# Patient Record
Sex: FEMALE | Race: WHITE
[De-identification: names, ages, dates, MRNs, and addresses within clinical notes are randomized per-mention and may not be internally consistent; named-entity substitution may affect disease eponyms.]

---

## 2017-04-18 ENCOUNTER — HOSPITAL ENCOUNTER (EMERGENCY)
Dept: HOSPITAL 62 - ER | Age: 62
Discharge: HOME | End: 2017-04-18
Payer: OTHER GOVERNMENT

## 2017-04-18 VITALS — DIASTOLIC BLOOD PRESSURE: 53 MMHG | SYSTOLIC BLOOD PRESSURE: 120 MMHG

## 2017-04-18 DIAGNOSIS — R10.30: Primary | ICD-10-CM

## 2017-04-18 DIAGNOSIS — N28.89: ICD-10-CM

## 2017-04-18 DIAGNOSIS — R03.0: ICD-10-CM

## 2017-04-18 DIAGNOSIS — Z90.49: ICD-10-CM

## 2017-04-18 DIAGNOSIS — R11.0: ICD-10-CM

## 2017-04-18 DIAGNOSIS — J45.909: ICD-10-CM

## 2017-04-18 DIAGNOSIS — Z90.710: ICD-10-CM

## 2017-04-18 LAB
%HYPO/RBC NFR BLD AUTO: (no result) %
ALBUMIN SERPL-MCNC: 4.4 G/DL (ref 3.5–5)
ALP SERPL-CCNC: 69 U/L (ref 38–126)
ALT SERPL-CCNC: 23 U/L (ref 9–52)
ANION GAP SERPL CALC-SCNC: 13 MMOL/L (ref 5–19)
ANISOCYTOSIS BLD QL SMEAR: SLIGHT
APPEARANCE UR: CLEAR
AST SERPL-CCNC: 20 U/L (ref 14–36)
BASO STIPL BLD QL SMEAR: PRESENT
BASOPHILS # BLD AUTO: 0.1 10^3/UL (ref 0–0.2)
BASOPHILS NFR BLD AUTO: 1.1 % (ref 0–2)
BILIRUB DIRECT SERPL-MCNC: 0.3 MG/DL (ref 0–0.4)
BILIRUB SERPL-MCNC: 0.9 MG/DL (ref 0.2–1.3)
BILIRUB UR QL STRIP: NEGATIVE
BUN SERPL-MCNC: 15 MG/DL (ref 7–20)
CALCIUM: 10.2 MG/DL (ref 8.4–10.2)
CHLORIDE SERPL-SCNC: 108 MMOL/L (ref 98–107)
CO2 SERPL-SCNC: 23 MMOL/L (ref 22–30)
CREAT SERPL-MCNC: 0.73 MG/DL (ref 0.52–1.25)
DACRYOCYTES BLD QL SMEAR: (no result)
EOSINOPHIL # BLD AUTO: 0 10^3/UL (ref 0–0.6)
EOSINOPHIL NFR BLD AUTO: 0.3 % (ref 0–6)
ERYTHROCYTE [DISTWIDTH] IN BLOOD BY AUTOMATED COUNT: 15.8 % (ref 11.5–14)
GLUCOSE SERPL-MCNC: 128 MG/DL (ref 75–110)
GLUCOSE UR STRIP-MCNC: NEGATIVE MG/DL
HCT VFR BLD CALC: 38.8 % (ref 36–47)
HGB BLD-MCNC: 12.1 G/DL (ref 12–15.5)
HGB HCT DIFFERENCE: -2.5
KETONES UR STRIP-MCNC: NEGATIVE MG/DL
LIPASE SERPL-CCNC: 107 U/L (ref 23–300)
LYMPHOCYTES # BLD AUTO: 1.1 10^3/UL (ref 0.5–4.7)
LYMPHOCYTES NFR BLD AUTO: 12.1 % (ref 13–45)
MCH RBC QN AUTO: 19.6 PG (ref 27–33.4)
MCHC RBC AUTO-ENTMCNC: 31.1 G/DL (ref 32–36)
MCV RBC AUTO: 63 FL (ref 80–97)
MICROCYTES BLD QL SMEAR: (no result)
MONOCYTES # BLD AUTO: 0.3 10^3/UL (ref 0.1–1.4)
MONOCYTES NFR BLD AUTO: 3.2 % (ref 3–13)
NEUTROPHILS # BLD AUTO: 7.8 10^3/UL (ref 1.7–8.2)
NEUTS SEG NFR BLD AUTO: 83.3 % (ref 42–78)
NITRITE UR QL STRIP: NEGATIVE
OVALOCYTES BLD QL SMEAR: (no result)
PATH REV BLD -IMP: (no result)
PH UR STRIP: 7 [PH] (ref 5–9)
POIKILOCYTOSIS BLD QL SMEAR: (no result)
POLYCHROMASIA BLD QL SMEAR: SLIGHT
POTASSIUM SERPL-SCNC: 4.6 MMOL/L (ref 3.6–5)
PROT SERPL-MCNC: 7.2 G/DL (ref 6.3–8.2)
PROT UR STRIP-MCNC: NEGATIVE MG/DL
RBC # BLD AUTO: 6.14 10^6/UL (ref 3.72–5.28)
SODIUM SERPL-SCNC: 144.3 MMOL/L (ref 137–145)
SP GR UR STRIP: 1
TARGETS BLD QL SMEAR: SLIGHT
UROBILINOGEN UR-MCNC: NEGATIVE MG/DL (ref ?–2)
WBC # BLD AUTO: 9.4 10^3/UL (ref 4–10.5)

## 2017-04-18 PROCEDURE — 80053 COMPREHEN METABOLIC PANEL: CPT

## 2017-04-18 PROCEDURE — 83690 ASSAY OF LIPASE: CPT

## 2017-04-18 PROCEDURE — 99284 EMERGENCY DEPT VISIT MOD MDM: CPT

## 2017-04-18 PROCEDURE — 85025 COMPLETE CBC W/AUTO DIFF WBC: CPT

## 2017-04-18 PROCEDURE — 74177 CT ABD & PELVIS W/CONTRAST: CPT

## 2017-04-18 PROCEDURE — 81001 URINALYSIS AUTO W/SCOPE: CPT

## 2017-04-18 PROCEDURE — 36415 COLL VENOUS BLD VENIPUNCTURE: CPT

## 2017-04-18 NOTE — ER DOCUMENT REPORT
ED GI/





- General


Chief Complaint: Abdominal Pain


Stated Complaint: ABDOMINAL PAIN


Mode of Arrival: Ambulatory


Information source: Patient


TRAVEL OUTSIDE OF THE [place] IN LAST 30 DAYS: No





- HPI


Patient complains to provider of: Abdominal pain


Notes: 





17 05:41


Patient arrives with complaints of abdominal pain with nausea and feelings of 

vomiting started earlier today.  She denies any chest pain or shortness of 

breath.  She denies any fevers.  She denies any dysuria or hematuria.  Nothing 

seems to make her pain better or worse.  She had no fever.  She denies any 

numbness tingling weakness or rash.  She denies any other complaints at this 

moment.  She has had prior hysterectomy and appendectomy.  She denies any other 

abdominal surgeries, she denies any history of bowel structure.





- Related Data


Allergies/Adverse Reactions: 


 





No Known Allergies Allergy (Unverified 17 00:43)


 











Past Medical History





- Social History


Smoking Status: Never Smoker


Frequency of alcohol use: Occasional


Drug Abuse: None


Family History: Reviewed & Not Pertinent


Patient has suicidal ideation: No


Patient has homicidal ideation: No


Pulmonary Medical History: Reports: Hx Asthma


Renal/ Medical History: Denies: Hx Peritoneal Dialysis


Past Surgical History: Reports: Hx Appendectomy, Hx  Section - x2, Hx 

Hysterectomy, Hx Tonsillectomy





Review of Systems





- Review of Systems


-: Yes All other systems reviewed and negative





Physical Exam





- Vital signs


Vitals: 





 











Temp Pulse Resp BP Pulse Ox


 


 97.7 F   80   18   165/74 H  97 


 


 17 00:35  17 00:35  17 00:35  17 00:35  17 00:35














- Notes


Notes: 


GENERAL: alert, cooperative, nontoxic, no distress.


HEAD: normocephalic, atraumatic


EYES: conjunctiva pink without discharge, no external redness or swelling.


EARS: no external swelling, no external redness


NOSE: atraumatic, no external swelling


MOUTH/THROAT: mucous membranes moist and pink, posterior pharynx without 

erythema, swelling, exudate. No trismus or drooling.


NECK: soft, supple, full range of motion, no meningismus.


CHEST: no distress, lungs clear and equal throughout.  No wheezing, rales, 

rhonchi.


CARDIAC: regular rate and rhythm, no murmur, normal capillary refill, normal 

pulses.  No peripheral edema noted.


ABDOMEN: Soft, mild generalized tenderness to the mid and lower abdomen.  No 

masses.  No hepatosplenomegaly.


BACK: full range of motion, no CVA tenderness.


EXTREMITIES: full range of motion of all extremities.  No redness, no swelling.


NEURO: alert and oriented -3, no focal deficits, full range of motion of all 

extremities.


PYSCH: appropriate mood, affect.  Patient is cooperative.


SKIN: pink, warm, dry, no rash.





Course





- Re-evaluation


Re-evalutation: 


17 05:42


Patient's nontoxic appearing with stable vitals.  The patient arrives with some 

intermittent abdominal pain with nausea no vomiting no diarrhea no fever.  Some 

mild lower abdominal tenderness on exam.  She had no chest pain or shortness of 

breath with this.  No urinary symptoms.  Blood work is unremarkable with a 

normal white count, normal LFTs and lipase normal unremarkable urine.  CT 

abdomen and pelvis with IV contrast shows no acute findings but a 1.5 cm left 

renal mass.  Recommending further evaluation of this with more dynamic testing.

  I discussed this with the patient and the importance for her to follow-up 

with her primary care physician in order to have further workup of this renal 

mass.  She verbalized an understanding of this.  I will discharge the patient 

home with some Bentyl and Zofran.  Follow up with her primary care doctor at 

the next available appointment.  She was instructed to return the emergency 

Department she develops increasing pain, high fever, persistent vomiting, chest 

pain, shortness of breath, any further concerns.





17 05:44


The patient is noted to have elevated blood pressure during today's emergency 

department visit.  The patient was informed of this finding.  The patient was 

instructed that this may be related to pre-hypertension and requires further 

evaluation with a primary care provider.  The patient has no hypertensive 

symptoms at this time.





The patient's evaluation of abdominal pain showed no obvious reason for pain 

during this emergency department visit today.  I explained that there is the 

possibility that there could be a serious reason for the abdominal pain that 

was not discovered with today's workup.  I stressed the importance of close 

observation as well as close follow-up for re-evaluation of the abdominal pain. 

The patient and/or family verbalized understanding of these instructions.  He 

will be instructed to return immediately to the emergency department for 

increased abdominal pain, persistent vomiting, blood in vomit or stool, or any 

other change in symptoms or concerns.





The patient's emergency department workup and current diagnosis were explained 

to the patient and or family.  Follow-up instructions were provided.  

Medications if prescribed were discussed. Instructions for when to return to 

the emergency department including specific  worrisome symptoms were discussed 

with the patient and/or family.





- Vital Signs


Vital signs: 





 











Temp Pulse Resp BP Pulse Ox


 


 97.7 F   80   18   165/74 H  97 


 


 17 00:35  17 00:35  17 00:35  17 00:35  17 00:35














- Laboratory


Result Diagrams: 


 17 03:30





 17 03:30


Laboratory results interpreted by me: 





 











  17





  03:30 03:30


 


RBC  6.14 H 


 


MCV  63 L 


 


MCH  19.6 L 


 


MCHC  31.1 L 


 


RDW  15.8 H 


 


Seg Neutrophils %  83.3 H 


 


Lymphocytes %  12.1 L 


 


Chloride   108 H


 


Glucose   128 H














- Diagnostic Test


Radiology reviewed: Image reviewed, Reports reviewed - CT abdomen and pelvis 

with IV contrast shows a left renal mass, possible hemorrhagic cyst, cannot 

completely rule out malignancy, recommend further imaging.





Discharge





- Discharge


Clinical Impression: 


 Renal mass, left





Abdominal pain


Qualifiers:


 Abdominal location: lower abdomen, unspecified Qualified Code(s): R10.30 - 

Lower abdominal pain, unspecified





Condition: Stable


Disposition: HOME, SELF-CARE


Instructions:  Abdominal Pain ([facility])


Additional Instructions: 


Take medications as prescribed.  Follow-up with your family doctor at the next 

available appointment for further imaging of the left renal mass.  He should 

follow-up sooner for increased pain, fever, persistent vomiting, chest pain, 

shortness of breath, any further concerns.





Your blood pressure was elevated during today's visit.  Have this rechecked 

with your doctor.


Prescriptions: 


Dicyclomine HCl [Bentyl 10 mg Capsule] 1 cap PO TID PRN #15 cap


 PRN Reason: 


Ondansetron HCl [Zofran 4 mg Tablet] 1 tab PO Q6H PRN #10 tablet


 PRN Reason: 


Forms:  Elevated Blood Pressure

## 2017-04-21 ENCOUNTER — HOSPITAL ENCOUNTER (OUTPATIENT)
Dept: HOSPITAL 62 - RAD | Age: 62
End: 2017-04-21
Attending: UROLOGY
Payer: OTHER GOVERNMENT

## 2017-04-21 DIAGNOSIS — N28.89: Primary | ICD-10-CM

## 2017-04-21 PROCEDURE — 74170 CT ABD WO CNTRST FLWD CNTRST: CPT

## 2017-09-26 ENCOUNTER — HOSPITAL ENCOUNTER (OUTPATIENT)
Dept: HOSPITAL 62 - WI | Age: 62
End: 2017-09-26
Attending: NURSE PRACTITIONER
Payer: OTHER GOVERNMENT

## 2017-09-26 DIAGNOSIS — Z12.31: Primary | ICD-10-CM

## 2017-09-26 PROCEDURE — G0202 SCR MAMMO BI INCL CAD: HCPCS

## 2017-09-26 PROCEDURE — 77067 SCR MAMMO BI INCL CAD: CPT

## 2017-09-26 PROCEDURE — 77063 BREAST TOMOSYNTHESIS BI: CPT

## 2017-10-25 ENCOUNTER — HOSPITAL ENCOUNTER (OUTPATIENT)
Dept: HOSPITAL 62 - RAD | Age: 62
End: 2017-10-25
Attending: UROLOGY
Payer: OTHER GOVERNMENT

## 2017-10-25 DIAGNOSIS — N28.1: Primary | ICD-10-CM

## 2017-10-25 PROCEDURE — 76770 US EXAM ABDO BACK WALL COMP: CPT

## 2017-10-25 NOTE — RADIOLOGY REPORT (SQ)
EXAM DESCRIPTION:  U/S RETROPERITON (RENAL/AORTA)



COMPLETED DATE/TIME:  10/25/2017 2:06 pm



REASON FOR STUDY:  CYST OF KIDNEY,ACQUIRED RENAL CYST (N28.1)



COMPARISON:  None.

CT abdomen 4/21/2017



TECHNIQUE:  Dynamic and static grayscale images acquired of the kidneys and bladder and recorded on P
ACS. Additional selected color Doppler and spectral images recorded.



LIMITATIONS:  None.



FINDINGS:  RIGHT KIDNEY: Normal size, 10.4 cm. Normal echogenicity. No solid or suspicious masses. No
 hydronephrosis. No calcifications.

LEFT KIDNEY:  Normal size, 11 cm. Normal echogenicity. No solid or suspicious masses. No hydronephros
is. No calcifications.

BLADDER: Normal.  No masses.  Ureteral jets were not seen.

OTHER FINDINGS: No other significant finding.



IMPRESSION:  NORMAL RENAL AND BLADDER ULTRASOUND.



TECHNICAL DOCUMENTATION:  JOB ID:  [id number]

 2011 [employer]- All Rights Reserved

## 2019-04-09 ENCOUNTER — HOSPITAL ENCOUNTER (OUTPATIENT)
Dept: HOSPITAL 62 - SC | Age: 64
Discharge: HOME | End: 2019-04-09
Attending: OPHTHALMOLOGY
Payer: MEDICARE

## 2019-04-09 DIAGNOSIS — H04.123: ICD-10-CM

## 2019-04-09 DIAGNOSIS — H25.813: Primary | ICD-10-CM

## 2019-04-09 DIAGNOSIS — Z79.899: ICD-10-CM

## 2019-04-09 DIAGNOSIS — H43.813: ICD-10-CM

## 2019-04-09 DIAGNOSIS — H35.363: ICD-10-CM

## 2019-04-09 DIAGNOSIS — Z88.2: ICD-10-CM

## 2019-04-09 DIAGNOSIS — J45.909: ICD-10-CM

## 2019-04-09 PROCEDURE — 66984 XCAPSL CTRC RMVL W/O ECP: CPT

## 2019-04-09 PROCEDURE — V2787 ASTIGMATISM-CORRECT FUNCTION: HCPCS

## 2019-04-09 RX ADMIN — BESIFLOXACIN PRN DROP: 6 SUSPENSION OPHTHALMIC at 00:00

## 2019-04-09 RX ADMIN — CYCLOPENTOLATE HYDROCHLORIDE AND PHENYLEPHRINE HYDROCHLORIDE PRN DROP: 2; 10 SOLUTION/ DROPS OPHTHALMIC at 10:35

## 2019-04-09 RX ADMIN — EPINEPHRINE ONE MG: 1 INJECTION, SOLUTION, CONCENTRATE INTRAVENOUS at 00:00

## 2019-04-09 RX ADMIN — TROPICAMIDE PRN DROP: 10 SOLUTION/ DROPS OPHTHALMIC at 10:21

## 2019-04-09 RX ADMIN — CYCLOPENTOLATE HYDROCHLORIDE AND PHENYLEPHRINE HYDROCHLORIDE PRN DROP: 2; 10 SOLUTION/ DROPS OPHTHALMIC at 10:21

## 2019-04-09 RX ADMIN — BUPIVACAINE HYDROCHLORIDE PRN ML: 7.5 INJECTION, SOLUTION EPIDURAL; RETROBULBAR at 00:00

## 2019-04-09 RX ADMIN — TROPICAMIDE PRN DROP: 10 SOLUTION/ DROPS OPHTHALMIC at 10:35

## 2019-04-09 RX ADMIN — EPINEPHRINE ONE MG: 1 INJECTION, SOLUTION, CONCENTRATE INTRAVENOUS at 11:13

## 2019-04-09 RX ADMIN — SODIUM CHONDROITIN SULFATE / SODIUM HYALURONATE ONE EACH: 0.55-0.5 INJECTION INTRAOCULAR at 11:13

## 2019-04-09 RX ADMIN — CYCLOPENTOLATE HYDROCHLORIDE AND PHENYLEPHRINE HYDROCHLORIDE PRN DROP: 2; 10 SOLUTION/ DROPS OPHTHALMIC at 10:11

## 2019-04-09 RX ADMIN — TROPICAMIDE PRN DROP: 10 SOLUTION/ DROPS OPHTHALMIC at 10:11

## 2019-04-09 RX ADMIN — DORZOLAMIDE HYDROCHLORIDE AND TIMOLOL MALEATE PRN DROP: 20; 5 SOLUTION OPHTHALMIC at 11:28

## 2019-04-09 RX ADMIN — LIDOCAINE HYDROCHLORIDE PRN ML: 40 INJECTION, SOLUTION RETROBULBAR; TOPICAL at 00:00

## 2019-04-09 RX ADMIN — SODIUM CHONDROITIN SULFATE / SODIUM HYALURONATE ONE EACH: 0.55-0.5 INJECTION INTRAOCULAR at 00:00

## 2019-04-09 RX ADMIN — BUPIVACAINE HYDROCHLORIDE PRN ML: 7.5 INJECTION, SOLUTION EPIDURAL; RETROBULBAR at 11:02

## 2019-04-09 RX ADMIN — LIDOCAINE HYDROCHLORIDE PRN ML: 40 INJECTION, SOLUTION RETROBULBAR; TOPICAL at 11:02

## 2019-04-09 RX ADMIN — TETRACAINE HYDROCHLORIDE PRN DROP: 25 LIQUID OPHTHALMIC at 10:36

## 2019-04-09 RX ADMIN — BESIFLOXACIN PRN DROP: 6 SUSPENSION OPHTHALMIC at 10:11

## 2019-04-09 RX ADMIN — BESIFLOXACIN PRN DROP: 6 SUSPENSION OPHTHALMIC at 10:22

## 2019-04-09 RX ADMIN — TETRACAINE HYDROCHLORIDE PRN DROP: 25 LIQUID OPHTHALMIC at 10:10

## 2019-04-09 RX ADMIN — BESIFLOXACIN PRN DROP: 6 SUSPENSION OPHTHALMIC at 11:28

## 2019-04-09 RX ADMIN — DORZOLAMIDE HYDROCHLORIDE AND TIMOLOL MALEATE PRN DROP: 20; 5 SOLUTION OPHTHALMIC at 00:00

## 2019-04-09 NOTE — [facility] DISCHARGE SUMMARY E
[facility] Discharge Summary



NAME: LEANDRO MARTINEZ

                                      MRN: [id number]

                                AGE: 63Y

ADMITTED: 04/09/2019           DISCHARGED:





FINAL DIAGNOSES:

1.   Cataract, right eye.

2.   Astigmatism, right eye.



PROCEDURE PERFORMED:

Phacoemulsification with toric intraocular lens implant, right eye.



HOSPITAL COURSE:

The patient is a 63-year-old lady who underwent uneventful cataract

extraction with intraocular lens implant, right eye, on 04/09/2019.  She

will be discharged to home.  She is instructed to resume preoperative

medications, take Tylenol as needed for discomfort, keep her eye shielded,

to use Durezol, Prolensa, and Besivance at 3 p.m. and 8 p.m., to follow up

in my office in 1 day.







DICTATING PHYSICIAN: CINDY GASTON M.D.



5006M              DT: 04/09/2019 1318

PHY#: [id number]        DD: 04/09/2019 1128

ID:   [id number]               JOB#: [id number]       ACCT: [id number]



cc:CINDY GASTON M.D.

>

## 2019-04-09 NOTE — [facility] OPERATIVE REPORT E
[facility] Operative Report



NAME: LEANDRO MARTINEZ

                                      MRN: [id number]

                             AGE: 63Y

DATE OF SURGERY:                    ROOM:



PREOPERATIVE DIAGNOSES:

1.   Cataract, right eye.

2.   Astigmatism, right eye.



POSTOPERATIVE DIAGNOSES:

1.   Cataract, right eye.

2.   Astigmatism, right eye.



PROCEDURE PERFORMED:

Phacoemulsification with toric intraocular lens implant, right eye.



SURGEON:

CINDY GASTON M.D.



ANESTHESIA:

Topical with MAC.



PROCEDURE PERFORMED:

The patient was brought to the Operating Room and placed on the operative

table. Following tetracaine drops, topical anesthesia was administered.

This consisted of instrument wipe pledgets soaked in a solution of 4%

Xylocaine mixed with 0.75% Marcaine in a 1:2 ratio. A 2 x 1 cm pledget was

placed in the superior fornix. A 1 x 1 cm pledget was placed in the

inferior fornix. The eye was patched shut for 5 minutes. The patch was

removed. The eye was sterilely prepped and draped in the usual manner. Lid

speculum was placed in the eye. The pledgets were removed. 4-0 black silk

sutures were placed around the superior and the inferior rectus muscles to

be used as traction. A conjunctival peritomy was made at the 10 o'clock

position. Hemostasis was obtained with bipolar cautery. A posterior limbal

groove was created using a crescent knife and dissected anteriorly towards

the cornea. A sharp point blade was used to create a paracentesis site at

the 2 o'clock position. A 2.4 mm keratome was used to enter the anterior

chamber through the groove. Viscoelastic was injected into the anterior

chamber. An anterior capsulotomy was performed using Utrata forceps in a

capsulorrhexis fashion. Hydrodissection and hydrodelineation were

performed. Phacoemulsification was performed in divide-and-conquer

technique.  Total phaco time 4.91 CDE.

Following this, the I/A unit was used to remove residual cortex.

Viscoelastic was injected into the capsular bag. Intraocular lens model

[id number], 12.0 diopters, serial number [id number] was placed in the

capsular bag. The I/A unit was used to remove residual viscoelastic. The

wound was seen to be watertight under high and low pressure, and no sutures

were placed. The intraocular lens was well centered. The pressure was

adjusted in the eye to normal pressure. The 4-0 black silk sutures and lid

speculum were removed. The eye was shielded after Besivance drops were

placed. The patient tolerated the procedure well and was sent to the

Recovery Room in good condition.

Prior to the surgery the patient was placed in a seated position and a 0270

and 180 degree axis of the eye was marked with a marking level.  Prior to

placing the lens implant the 92 degree axis was marked on the eye and the

lens was centered at this axis.





DICTATING PHYSICIAN: CINDY GASTON M.D.



5006M              DT: 04/09/2019 1308

PHY#: [id number]        DD: 04/09/2019 1128

ID:   [id number]               JOB#: [id number]       ACCT: [id number]



cc:CINDY GASTON M.D.

>

## 2019-11-04 ENCOUNTER — HOSPITAL ENCOUNTER (OUTPATIENT)
Dept: HOSPITAL 62 - WI | Age: 64
End: 2019-11-04
Attending: OBSTETRICS & GYNECOLOGY
Payer: MEDICARE

## 2019-11-04 DIAGNOSIS — Z12.31: Primary | ICD-10-CM

## 2019-11-04 PROCEDURE — 77067 SCR MAMMO BI INCL CAD: CPT

## 2019-11-04 PROCEDURE — 77063 BREAST TOMOSYNTHESIS BI: CPT

## 2019-11-04 NOTE — WOMENS IMAGING REPORT
EXAM DESCRIPTION:  3D SCREENING MAMMO BILAT



COMPLETED DATE/TIME:  11/4/2019 1:38 pm



REASON FOR STUDY:  Z12.31 ENCOUNTER FOR SCREENING MAMMOGRAM FOR MALIGNANT NEOPLASM OF BREAST Z12.31  
ENCNTR SCREEN MAMMOGRAM FOR MALIGNANT NEOPLASM OF DI



COMPARISON:  2017, 2018



EXAM PARAMETERS:  Views: Standard craniocaudal and mediolateral oblique views of each breast recorded
 using digital acquisition and breast tomosynthesis.

Read with the assistance of CAD.

.Former[facility] - R2  Version 9.2



LIMITATIONS:  None.



FINDINGS:  No suspicious masses, suspicious calcifications or architectural distortion. No areas of c
oncern.



IMPRESSION:   NEGATIVE MAMMOGRAM. BIRADS 1.



BREAST DENSITY:  b. There are scattered areas of fibroglandular density.



BIRAD:  ASSESSMENT:  1 NEGATIVE



RECOMMENDATION:  ROUTINE SCREENING



COMMENT:  The patient has been notified of the results by letter per MQSA requirements. Additional no
tification policies are in place for contacting patient with suspicious or incomplete findings.

Quality ID #225: The [employer] College [employer] recommends an annual screening mammogram for women
 aged 40 years or over. This facility utilizes a reminder system to ensure that all patients receive 
reminder letters, and/or direct phone calls for appointments. This includes reminders for routine scr
eening mammograms, diagnostic mammograms, or other Breast Imaging Interventions when appropriate.  Th
is patient will be placed in the appropriate reminder system.



TECHNICAL DOCUMENTATION:  FINDING NUMBER: (1)

ASSESSMENT:  (1)

JOB ID:  [id number]

 2011 [employer]- All Rights Reserved



Reading location - IP/workstation name: [email or web address]